# Patient Record
(demographics unavailable — no encounter records)

---

## 2024-11-26 NOTE — HISTORY OF PRESENT ILLNESS
[de-identified] : Mr. Jaret Anderson is 70 years old male with lymphadenopathy here for consultation Accompanied by his wife Dr. Mojicaine Monica.  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   FHx: Father with esophageal cancer (smoker) - late 50s  SurgHx: spine surgery, hip surgery   SocialHx: never smoked Alcohol - occasional  Denies any illicit drugs Retired [employer] with [place] Lives with Ese Anderson (HCP)  Colonoscopy - 2023   He has pain on left upper abdomen here and there, currently asymptomatic.  Denes of fever, chills, night sweats, or unintentional weight loss

## 2024-11-26 NOTE — HISTORY OF PRESENT ILLNESS
[de-identified] : Mr. Jaret Anderson is 70 years old male with lymphadenopathy here for consultation Accompanied by his wife Dr. Mojicaine Monica.  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   FHx: Father with esophageal cancer (smoker) - late 50s  SurgHx: spine surgery, hip surgery   SocialHx: never smoked Alcohol - occasional  Denies any illicit drugs Retired [employer] with [place] Lives with Ese Anderson (HCP)  Colonoscopy - 2023   He has pain on left upper abdomen here and there, currently asymptomatic.  Denes of fever, chills, night sweats, or unintentional weight loss

## 2024-11-26 NOTE — ASSESSMENT
[FreeTextEntry1] : Mesenteric Lymphadenopathy Has chronic constipation, had CT when symptoms got worse He does get occasional night sweats No fevers, weight loss Colonoscopy: 2023 in [place] was normal.  Father with esophageal ca from smoking Never smoker CT chest abdomen and pelvis [10/31/2024] in [place]: multiple prominent mesenteric LNs measuring up to 12.4 cm  Discussed about differential diagnosis including reactive process [and infection/inflammation] versus malignancy [lymphoma].  He has no prior imaging scans available for comparison Upon questioning, he does report night sweats but no fevers or weight loss. Recommend sending blood work including CBC, CMP, ESR, CRP, peripheral blood flow cytometry PET/CT Depending on the above finding, he may need biopsy [if concerning]  Constipation: Advised to increase fiber in the diet and hydration. Discussed at length about trying whole food plant-based diet for 1 to 3 months. Patient can continue longer if desired. Available evidence about the benefits reviewed and discussed with the patient.  Resources in the form of books, websites, documentaries given to the patient in writing. Advised to take B12 1000 mcg QD if the patient chooses to switch to plant based diet. Patient is motivated to try the diet. His daughter is vegan and he can always ask her  Patient had multiple questions which were answered to satisfaction  Follow-up in a few weeks No lab

## 2024-11-26 NOTE — BEGINNING OF VISIT
[0] : 2) Feeling down, depressed, or hopeless: Not at all (0) [PHQ-2 Negative] : PHQ-2 Negative [Pain Scale: ___] : On a scale of 1-10, today the patient's pain is a(n) [unfilled]. [Never] : Never [Date Discussed (MM/DD/YY): ___] : Discussed: [unfilled] [Abdominal Pain] : no abdominal pain [Vomiting] : no vomiting [Constipation] : no constipation [Diarrhea Character] : Diarrhea: Grade 0

## 2024-12-11 NOTE — HISTORY OF PRESENT ILLNESS
[de-identified] : Mr. Jaret Anderson is 70 years old male with lymphadenopathy here for consultation Accompanied by his wife Dr. Ese Juarezoney.  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   FHx: Father with esophageal cancer (smoker) - late 50s  SurgHx: spine surgery, hip surgery   SocialHx: never smoked Alcohol - occasional  Denies any illicit drugs Retired [employer] with [place] Lives with Ese Anderson (HCP)  Colonoscopy - 2023   He has pain on left upper abdomen here and there, currently asymptomatic.  Denes of fever, chills, night sweats, or unintentional weight loss   12/02/24 PET SKUL-THI ONC FDG INIT   FINDINGS:  Head and Neck: No abnormal FDG uptake. No hypermetabolic cervical or supraclavicular lymphadenopathy.   Chest: No hypermetabolic pulmonary nodules. No hypermetabolic mediastinal or axillary lymphadenopathy.   Abdomen and Pelvis: Physiologic activity is noted in the liver, spleen, pancreas, bowel, adrenal glands, and urinary collecting system. No hypermetabolic lymphadenopathy. There are a few prominent mesenteric lymph nodes seen on CT which do not demonstrate FDG avidity. The largest of these nodes is 1.1cm. There is mild adjacent inflammatory stranding. Findings are improved from the prior CT.   Musculoskeletal: No abnormal foci of uptake in the osseous structures. Right hip arthroplasty. Lumbosacral fusion hardware.   IMPRESSION:   1. No evidence of FDG avid malignancy.  2. Few prominent mesenteric lymph nodes without FDG avidity, the largest of which is 1.1cm. There is mild adjacent inflammatory stranding. Findings are improved from CT dated 10/31/2024 and are likely infectious/inflammatory in nature. [de-identified] : Patient is seen today for follow up Accompanied by his wife  Overall feels well PET (12/2/24):  1. No evidence of FDG avid malignancy.   2. Few prominent mesenteric lymph nodes without FDG avidity, the largest of which is 1.1cm. There is mild adjacent inflammatory stranding. Findings are improved from CT dated 10/31/2024 and are likely infectious/inflammatory in nature.

## 2024-12-11 NOTE — HISTORY OF PRESENT ILLNESS
[de-identified] : Mr. Jaret Anderson is 70 years old male with lymphadenopathy here for consultation Accompanied by his wife Dr. Ese Juarezoney.  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   FHx: Father with esophageal cancer (smoker) - late 50s  SurgHx: spine surgery, hip surgery   SocialHx: never smoked Alcohol - occasional  Denies any illicit drugs Retired [employer] with [place] Lives with Ese Anderson (HCP)  Colonoscopy - 2023   He has pain on left upper abdomen here and there, currently asymptomatic.  Denes of fever, chills, night sweats, or unintentional weight loss   12/02/24 PET SKUL-THI ONC FDG INIT   FINDINGS:  Head and Neck: No abnormal FDG uptake. No hypermetabolic cervical or supraclavicular lymphadenopathy.   Chest: No hypermetabolic pulmonary nodules. No hypermetabolic mediastinal or axillary lymphadenopathy.   Abdomen and Pelvis: Physiologic activity is noted in the liver, spleen, pancreas, bowel, adrenal glands, and urinary collecting system. No hypermetabolic lymphadenopathy. There are a few prominent mesenteric lymph nodes seen on CT which do not demonstrate FDG avidity. The largest of these nodes is 1.1cm. There is mild adjacent inflammatory stranding. Findings are improved from the prior CT.   Musculoskeletal: No abnormal foci of uptake in the osseous structures. Right hip arthroplasty. Lumbosacral fusion hardware.   IMPRESSION:   1. No evidence of FDG avid malignancy.  2. Few prominent mesenteric lymph nodes without FDG avidity, the largest of which is 1.1cm. There is mild adjacent inflammatory stranding. Findings are improved from CT dated 10/31/2024 and are likely infectious/inflammatory in nature. [de-identified] : Patient is seen today for follow up Accompanied by his wife  Overall feels well PET (12/2/24):  1. No evidence of FDG avid malignancy.   2. Few prominent mesenteric lymph nodes without FDG avidity, the largest of which is 1.1cm. There is mild adjacent inflammatory stranding. Findings are improved from CT dated 10/31/2024 and are likely infectious/inflammatory in nature.

## 2024-12-11 NOTE — ASSESSMENT
[FreeTextEntry1] : Mesenteric Lymphadenopathy Has chronic constipation, had CT when symptoms got worse He does get occasional night sweats No fevers, weight loss Colonoscopy: 2023 in [place] was normal.  Father with esophageal ca from smoking Never smoker CT chest abdomen and pelvis [10/31/2024] in [place]: multiple prominent mesenteric LNs measuring up to 12.4 cm (subsequently patient received a call to inform that 12.4 cm LN was a typo and the area was mere inflammation)  He is seen today for follow-up Overall feels well Labs and imaging reviewed analyzed and discussed PET/CT without any evidence of malignancy.  Also the mesenteric lymph nodes largest of which was around 1.1 cm seem to be improving suggestive of infectious/inflammatory in nature Patient reassured No further workup necessary at this point  Constipation: Advised to increase fiber in the diet and hydration. He is working on increasing fiber in diet  Patient had multiple questions which were answered to satisfaction  Follow up as needed